# Patient Record
(demographics unavailable — no encounter records)

---

## 2025-06-11 NOTE — PHYSICAL EXAM
[de-identified] : Well appearing person in no acute distress. Appears their stated age. Alert and oriented x3. Normal mood and affect. They have good range of motion of their shoulder today with the exception that they are lacking about 10 degrees of forward flexion and abduction. They have tenderness over their biceps. Positive Runnels's test. Positive throwing test. Positive Speed's test. Positive Myers test. They have some weakness on strength testing on their supraspinatus with discomfort on testing of their supraspinatus. They have no tenderness over their AC joint. No pain with range of motion of their neck or elbow. Neurovascularly intact in their upper extremity. [ ]  [de-identified] : 4 views left shoulder ordered and reviewed.  These demonstrate no acute osseous abnormalities.  Joint spaces well-maintained

## 2025-06-11 NOTE — HISTORY OF PRESENT ILLNESS
[de-identified] : Very pleasant 67-year-old female comes in with left shoulder discomfort.  She is left-hand dominant.  This has been going on for some time.  No recent trauma.  No recent treatment.  She noticed that the shoulder hurts as well as is weak.

## 2025-06-11 NOTE — PHYSICAL EXAM
[de-identified] : Well appearing person in no acute distress. Appears their stated age. Alert and oriented x3. Normal mood and affect. They have good range of motion of their shoulder today with the exception that they are lacking about 10 degrees of forward flexion and abduction. They have tenderness over their biceps. Positive [place]'s test. Positive throwing test. Positive Speed's test. Positive Myers test. They have some weakness on strength testing on their supraspinatus with discomfort on testing of their supraspinatus. They have no tenderness over their AC joint. No pain with range of motion of their neck or elbow. Neurovascularly intact in their upper extremity. [ ]  [de-identified] : 4 views left shoulder ordered and reviewed.  These demonstrate no acute osseous abnormalities.  Joint spaces well-maintained

## 2025-06-11 NOTE — DISCUSSION/SUMMARY
[de-identified] : I had a long discussion with the patient about the nature of their shoulder problem and all available treatment options. We discussed operative and non-operative interventions. After a long discussion, they would like to proceed with a one-time steroid injection followed by structured physical therapy. We also discussed use of anti-inflammatory medications as well as their risks and benefits. I will see them back in approximately 6 weeks' time. If they are still having pain, I would recommend an MRI of the shoulder to evaluate the status of the rotator cuff. All of their questions were answered, they agree with the treatment plan. Procedure: After obtaining verbal consent and under sterile conditions, 1.5cc of 1% Lidocaine and 1cc of 40mg of Kenalog was injected into the subacromial space via an posterior approach. They tolerated this well.  This injection was done under ultrasound guidance

## 2025-06-11 NOTE — DISCUSSION/SUMMARY
[de-identified] : I had a long discussion with the patient about the nature of their shoulder problem and all available treatment options. We discussed operative and non-operative interventions. After a long discussion, they would like to proceed with a one-time steroid injection followed by structured physical therapy. We also discussed use of anti-inflammatory medications as well as their risks and benefits. I will see them back in approximately 6 weeks' time. If they are still having pain, I would recommend an MRI of the shoulder to evaluate the status of the rotator cuff. All of their questions were answered, they agree with the treatment plan. Procedure: After obtaining verbal consent and under sterile conditions, 1.5cc of 1% Lidocaine and 1cc of 40mg of Kenalog was injected into the subacromial space via an posterior approach. They tolerated this well.  This injection was done under ultrasound guidance

## 2025-06-11 NOTE — HISTORY OF PRESENT ILLNESS
[de-identified] : Very pleasant 67-year-old female comes in with left shoulder discomfort.  She is left-hand dominant.  This has been going on for some time.  No recent trauma.  No recent treatment.  She noticed that the shoulder hurts as well as is weak.

## 2025-07-07 NOTE — ASSESSMENT
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

## 2025-07-07 NOTE — HISTORY OF PRESENT ILLNESS
[de-identified] : Her shoulder is doing great since the injection.  Pain is controlled.  Feels like everything is moving the right direction.  Biggest issue right now for her is her right elbow.  She has a history of a bad right elbow many years ago that was fixed with the surgery.  Since that time she has had pain as well as stiffness in the elbow.  Pain is getting worse recently.  Feels like her motion is coming down.

## 2025-07-07 NOTE — REASON FOR VISIT
[Follow-Up Visit] : a follow-up visit for [FreeTextEntry2] : Left shoulder impingement with biceps tendinitis and rotator cuff tear s/p injection and right elbow pain

## 2025-07-07 NOTE — PHYSICAL EXAM
[de-identified] : Well-appearing female in no acute distress.  Alert and oriented x 3.  Appears her stated age.  Normal mood and affect.  She has near full extension.  This is about 20 degrees of flexion of the elbow.  She has some tenderness over the soft spot laterally.  No pain with pronation or supination. [de-identified] : 3 views right elbow ordered and reviewed.  These demonstrate the previous screws and K wires that were placed.  There does appear to be a free K wire within the tricep tendon.

## 2025-07-07 NOTE — DISCUSSION/SUMMARY
[de-identified] : Along discussion with the patient.  We discussed operative and nonoperative treatment options.  Right now she try a one-time steroid injection followed by structured physical therapy.  Will see how she does over the next several weeks.  She continues to have pain and discomfort in the elbow we may consider other intervention which could involve an open elbow release and removal of hardware.  Will see how she responds to the injection.  We also discussed use of anti-inflammatory medications as well as the risks and benefits.  I will see her back in 4 to 6 weeks time for repeat evaluation.  All of her questions were answered, she agrees with the plan.  After obtaining verbal consent and under sterile conditions, 1.5cc of 1% lidocaine  and 1cc of 40mg of Kenalog was injected into the elbow via the soft spot portal. The patient tolerated the procedure well